# Patient Record
Sex: FEMALE | Race: ASIAN | NOT HISPANIC OR LATINO
[De-identification: names, ages, dates, MRNs, and addresses within clinical notes are randomized per-mention and may not be internally consistent; named-entity substitution may affect disease eponyms.]

---

## 2019-04-02 PROBLEM — Z00.00 ENCOUNTER FOR PREVENTIVE HEALTH EXAMINATION: Status: ACTIVE | Noted: 2019-04-02

## 2019-04-10 ENCOUNTER — APPOINTMENT (OUTPATIENT)
Dept: COLORECTAL SURGERY | Facility: CLINIC | Age: 49
End: 2019-04-10
Payer: MEDICAID

## 2019-05-08 ENCOUNTER — APPOINTMENT (OUTPATIENT)
Dept: COLORECTAL SURGERY | Facility: CLINIC | Age: 49
End: 2019-05-08
Payer: MEDICAID

## 2019-05-08 VITALS
HEART RATE: 65 BPM | SYSTOLIC BLOOD PRESSURE: 113 MMHG | WEIGHT: 122 LBS | DIASTOLIC BLOOD PRESSURE: 88 MMHG | BODY MASS INDEX: 21.09 KG/M2 | HEIGHT: 63.78 IN

## 2019-05-08 DIAGNOSIS — K64.8 OTHER HEMORRHOIDS: ICD-10-CM

## 2019-05-08 DIAGNOSIS — K60.2 ANAL FISSURE, UNSPECIFIED: ICD-10-CM

## 2019-05-08 DIAGNOSIS — Z80.0 FAMILY HISTORY OF MALIGNANT NEOPLASM OF DIGESTIVE ORGANS: ICD-10-CM

## 2019-05-08 PROCEDURE — 99202 OFFICE O/P NEW SF 15 MIN: CPT | Mod: 25

## 2019-05-08 PROCEDURE — 46600 DIAGNOSTIC ANOSCOPY SPX: CPT

## 2019-05-08 RX ORDER — DOCUSATE SODIUM 100 MG/1
100 CAPSULE ORAL 3 TIMES DAILY
Qty: 90 | Refills: 5 | Status: ACTIVE | COMMUNITY
Start: 2019-05-08 | End: 1900-01-01

## 2019-05-09 NOTE — ASSESSMENT
[FreeTextEntry1] : will plan for anal dilation, tag and external hemorrhoidectomy.\par I had an extensive discussion with the patient (30 minutes) regarding the diagnosis of an anal fissure. 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 The medical and surgical management options have been reviewed in detail including lubricant suppository therapy, stool softeners, fiber agents, and surgical anal dilatation. The risks, benefits and alternatives including bleeding infection, nonhealing wounds, and permanent leakage, seepage and incontinence have been detailed. All questions have been reviewed and answered. Appropriate literature has been shared with the patient.\par \par The risks and befits of the treatment plan were reviewed and outlined with the patient. The patient understands the associated risks of the involved treatment and wishes to proceed. All questions were answered and explained.\par \par

## 2019-05-09 NOTE — CONSULT LETTER
[Dear  ___] : Dear  [unfilled], [Consult Letter:] : I had the pleasure of evaluating your patient, [unfilled]. [( Thank you for referring [unfilled] for consultation for _____ )] : Thank you for referring [unfilled] for consultation for [unfilled] [Please see my note below.] : Please see my note below. [Sincerely,] : Sincerely, [Consult Closing:] : Thank you very much for allowing me to participate in the care of this patient.  If you have any questions, please do not hesitate to contact me. [FreeTextEntry2] : CHATA GARDNER [FreeTextEntry3] : DIANNE PINEDA MD

## 2019-05-09 NOTE — PHYSICAL EXAM
[Posterior] : posteriorly [None] : there was no rectal mass  [Tight] : was tight [de-identified] : large indurated posterior tag with small left anterior external hemorrhoid [FreeTextEntry1] : A lighted anoscope was passed into the anal canal and the entire anal mucosal surface was inspected..  THe findings revealed moderate internal hemorrhoids. No masses or lesions were identified.\par \par

## 2019-05-09 NOTE — HISTORY OF PRESENT ILLNESS
[FreeTextEntry1] : 47 yo Mandarin speaking F presents for evaluation of hemorrhoids\par \par Hx of hemorrhoids for last couple of years, denies hx of treatment\par Feels a few small lumps "always outside"\par c/o occasional BRBPR w/ some BMs, noted on TP\par Reports occasional pain and itching when she wipes\par Denies use of OTC medications\par \par BH: daily, without complaints\par Reports adequate dietary fiber and water intake\par Denies fiber supplements or stool softeners\par Pt completed Colonoscopy 3/15/19: Internal and external hemorrhoids\par (+) sister diagnosed w/ rectal CA age 55\par Denies ASA/NSAIDs in last 7 days\par